# Patient Record
Sex: MALE | Race: WHITE | Employment: FULL TIME | URBAN - METROPOLITAN AREA
[De-identification: names, ages, dates, MRNs, and addresses within clinical notes are randomized per-mention and may not be internally consistent; named-entity substitution may affect disease eponyms.]

---

## 2022-03-26 ENCOUNTER — APPOINTMENT (OUTPATIENT)
Dept: GENERAL RADIOLOGY | Age: 32
End: 2022-03-26
Attending: EMERGENCY MEDICINE
Payer: OTHER MISCELLANEOUS

## 2022-03-26 ENCOUNTER — HOSPITAL ENCOUNTER (EMERGENCY)
Age: 32
Discharge: HOME | End: 2022-03-26
Attending: EMERGENCY MEDICINE
Payer: OTHER MISCELLANEOUS

## 2022-03-26 VITALS
HEIGHT: 70 IN | DIASTOLIC BLOOD PRESSURE: 145 MMHG | OXYGEN SATURATION: 94 % | WEIGHT: 210 LBS | RESPIRATION RATE: 16 BRPM | SYSTOLIC BLOOD PRESSURE: 189 MMHG | TEMPERATURE: 98.2 F | BODY MASS INDEX: 30.06 KG/M2 | HEART RATE: 101 BPM

## 2022-03-26 DIAGNOSIS — S62.635B OPEN DISPLACED FRACTURE OF DISTAL PHALANX OF LEFT RING FINGER, INITIAL ENCOUNTER: Primary | ICD-10-CM

## 2022-03-26 DIAGNOSIS — I10 PRIMARY HYPERTENSION: ICD-10-CM

## 2022-03-26 PROCEDURE — 96372 THER/PROPH/DIAG INJ SC/IM: CPT

## 2022-03-26 PROCEDURE — 74011250637 HC RX REV CODE- 250/637: Performed by: EMERGENCY MEDICINE

## 2022-03-26 PROCEDURE — 99284 EMERGENCY DEPT VISIT MOD MDM: CPT

## 2022-03-26 PROCEDURE — 74011250636 HC RX REV CODE- 250/636: Performed by: EMERGENCY MEDICINE

## 2022-03-26 PROCEDURE — 74011000250 HC RX REV CODE- 250: Performed by: EMERGENCY MEDICINE

## 2022-03-26 RX ORDER — CEFAZOLIN SODIUM 1 G/3ML
1 INJECTION, POWDER, FOR SOLUTION INTRAMUSCULAR; INTRAVENOUS
Status: DISCONTINUED | OUTPATIENT
Start: 2022-03-26 | End: 2022-03-26

## 2022-03-26 RX ORDER — AMLODIPINE BESYLATE 10 MG/1
10 TABLET ORAL DAILY
Qty: 30 TABLET | Refills: 0 | Status: SHIPPED | OUTPATIENT
Start: 2022-03-26 | End: 2022-04-25

## 2022-03-26 RX ORDER — CEPHALEXIN 500 MG/1
500 CAPSULE ORAL 2 TIMES DAILY
Qty: 8 CAPSULE | Refills: 0 | Status: SHIPPED | OUTPATIENT
Start: 2022-03-26 | End: 2022-03-30

## 2022-03-26 RX ORDER — AMLODIPINE BESYLATE 5 MG/1
10 TABLET ORAL
Status: COMPLETED | OUTPATIENT
Start: 2022-03-26 | End: 2022-03-26

## 2022-03-26 RX ADMIN — AMLODIPINE BESYLATE 10 MG: 5 TABLET ORAL at 15:31

## 2022-03-26 RX ADMIN — CEFAZOLIN SODIUM 1000 MG: 1 INJECTION, POWDER, FOR SOLUTION INTRAMUSCULAR; INTRAVENOUS at 15:32

## 2022-03-26 NOTE — ED PROVIDER NOTES
Patient is a 79-year-old gentleman who comes into the emergency department after a crush injury to his left fourth digit. He was seen at [facility] urgent care center where he was diagnosed with a fracture and sent to the emergency department because he had an injury to the nailbed and they were concerned that he may have an open fracture. Patient denies significant amount of bleeding from his wound and reports that his plan is moderate but worse since the splint was placed. He received a tetanus booster prior to being discharged from the urgent care. Patient was found to be hypertensive on arrival to the emergency department; he reports that he has been noncompliant with his 10 mg of amlodipine because he has been unable to get a refill from the [facility] and has not yet established care with a new primary care provider. He has not had any chest pain, shortness of breath, lightheadedness, dizziness, numbness, tingling, or other signs or symptoms of a hypertensive emergency. The history is provided by the patient. Finger Pain   This is a new problem. The current episode started 1 to 2 hours ago. The problem occurs constantly. The pain is present in the left fingers. The quality of the pain is described as aching. The pain is moderate. The symptoms are aggravated by palpation. There has been a history of trauma (crush injury to finger tip ).         Past Medical History:   Diagnosis Date    Anxiety     Hypertension        Past Surgical History:   Procedure Laterality Date    HX WISDOM TEETH EXTRACTION           Family History:   Problem Relation Age of Onset    Hypertension Mother     Diabetes Father     Hypertension Father        Social History     Socioeconomic History    Marital status: SINGLE     Spouse name: Not on file    Number of children: Not on file    Years of education: Not on file    Highest education level: Not on file   Occupational History    Not on file   Tobacco Use    Smoking status: Current Every Day Smoker     Packs/day: 0.25    Smokeless tobacco: Never Used   Substance and Sexual Activity    Alcohol use: Yes    Drug use: No    Sexual activity: Not on file   Other Topics Concern    Not on file   Social History Narrative    Not on file     Social Determinants of Health     Financial Resource Strain:     Difficulty of Paying Living Expenses: Not on file   Food Insecurity:     Worried About Running Out of Food in the Last Year: Not on file    Diogenes of Food in the Last Year: Not on file   Transportation Needs:     Lack of Transportation (Medical): Not on file    Lack of Transportation (Non-Medical): Not on file   Physical Activity:     Days of Exercise per Week: Not on file    Minutes of Exercise per Session: Not on file   Stress:     Feeling of Stress : Not on file   Social Connections:     Frequency of Communication with Friends and Family: Not on file    Frequency of Social Gatherings with Friends and Family: Not on file    Attends Sabianism Services: Not on file    Active Member of [address] [place] [address] or Organizations: Not on file    Attends Club or Organization Meetings: Not on file    Marital Status: Not on file   Intimate Partner Violence:     Fear of Current or Ex-Partner: Not on file    Emotionally Abused: Not on file    Physically Abused: Not on file    Sexually Abused: Not on file   Housing Stability:     Unable to Pay for Housing in the Last Year: Not on file    Number of [place] in the Last Year: Not on file    Unstable Housing in the Last Year: Not on file         ALLERGIES: Patient has no known allergies. Review of Systems   Respiratory: Negative for shortness of breath. Cardiovascular: Negative for chest pain. Musculoskeletal: Positive for arthralgias and joint swelling. Skin: Positive for wound. All other systems reviewed and are negative.       Vitals:    03/26/22 1436 03/26/22 1438   BP: (!) 182/148 (!) 178/132   Pulse: 90    Resp: 18 Temp: 98.2 °F (36.8 °C)    SpO2: 98%    Weight: 95.3 kg (210 lb)    Height: 5' 10\" (1.778 m)             Physical Exam  Vitals and nursing note reviewed. Constitutional:       Appearance: He is well-developed. HENT:      Head: Normocephalic and atraumatic. Eyes:      General: No scleral icterus. Cardiovascular:      Rate and Rhythm: Normal rate. Pulmonary:      Effort: Pulmonary effort is normal.   Abdominal:      General: There is no distension. Musculoskeletal:      Left hand: Swelling and tenderness present. No deformity. Normal range of motion. Normal pulse. Cervical back: Normal range of motion. Comments: Small laceration and abrasion to the cuticle of the left fourth digit, no subungual hematoma, nail in place   Skin:     General: Skin is warm and dry. Findings: No erythema or rash. Neurological:      General: No focal deficit present. Mental Status: He is alert and oriented to person, place, and time. Psychiatric:         Mood and Affect: Mood normal.         Behavior: Behavior normal.          MDM       Procedures    Pt presents with an extremity injury and fracture without dislocation or other significant musculoskeletal injury. Patient was discharged home with a plan for pain control as well as instructions on managing his injuries and precautions for returning to the emergency department. He was started on abx prophylaxis in case of open fracture. No evidence of compartment syndrome on evaluation. Patient will be discharged home to follow-up with the hand surgeon this week as instructed in discharge paperwork. Education was provided to the patient today regarding their hypertension in the setting of being off his antihypertensives. Patient has not signs/symptoms of ACS, CHF, CVA, or other hypertensive emergency.   Patient was given an Rx for his antihypertensives and an initial dose in the ED, patient is made aware of their elevated blood pressure and is instructed to follow up this week with their Primary Care for a recheck.

## 2022-03-26 NOTE — ED TRIAGE NOTES
Pt presents to ER with c/o left 4th digit injury that occurred around 10am this morning with dropping a hydrolic on his finger. Pt seen at [facility] and referred to ER for open fracture of distal phalanx of left ring finger. Pt brought d/c paperwork and XR disc. Splint on left finger. Pt BP high x2 readings. States he does not have primary doctor and will not return to [facility] for his medications. Pt denies CP, SOB, dizziness or other sx.  EVELYN 2 level assigned based on VS.